# Patient Record
Sex: FEMALE | Race: WHITE | NOT HISPANIC OR LATINO | Employment: PART TIME | URBAN - METROPOLITAN AREA
[De-identification: names, ages, dates, MRNs, and addresses within clinical notes are randomized per-mention and may not be internally consistent; named-entity substitution may affect disease eponyms.]

---

## 2021-04-14 ENCOUNTER — IMMUNIZATIONS (OUTPATIENT)
Dept: FAMILY MEDICINE CLINIC | Facility: HOSPITAL | Age: 56
End: 2021-04-14

## 2021-04-14 DIAGNOSIS — Z23 ENCOUNTER FOR IMMUNIZATION: Primary | ICD-10-CM

## 2021-04-14 PROCEDURE — 0001A SARS-COV-2 / COVID-19 MRNA VACCINE (PFIZER-BIONTECH) 30 MCG: CPT

## 2021-04-14 PROCEDURE — 91300 SARS-COV-2 / COVID-19 MRNA VACCINE (PFIZER-BIONTECH) 30 MCG: CPT

## 2021-05-10 ENCOUNTER — IMMUNIZATIONS (OUTPATIENT)
Dept: FAMILY MEDICINE CLINIC | Facility: HOSPITAL | Age: 56
End: 2021-05-10

## 2021-05-10 DIAGNOSIS — Z23 ENCOUNTER FOR IMMUNIZATION: Primary | ICD-10-CM

## 2021-05-10 PROCEDURE — 91300 SARS-COV-2 / COVID-19 MRNA VACCINE (PFIZER-BIONTECH) 30 MCG: CPT

## 2021-05-10 PROCEDURE — 0002A SARS-COV-2 / COVID-19 MRNA VACCINE (PFIZER-BIONTECH) 30 MCG: CPT

## 2021-07-12 ENCOUNTER — VBI (OUTPATIENT)
Dept: ADMINISTRATIVE | Facility: OTHER | Age: 56
End: 2021-07-12

## 2021-07-12 NOTE — TELEPHONE ENCOUNTER
Upon review of the In Basket request we were able to locate, review, and update the patient chart as requested for Immunization(s) Influenza, Mammogram and Pap Smear (HPV) aka Cervical Cancer Screening  Any additional questions or concerns should be emailed to the Practice Liaisons via [email or web address] email, please do not reply via In Basket      Thank you  Dereje Cain

## 2021-08-13 RX ORDER — NEBIVOLOL 5 MG/1
5 TABLET ORAL DAILY
COMMUNITY

## 2021-08-13 RX ORDER — CHLORAL HYDRATE 500 MG
1000 CAPSULE ORAL DAILY
COMMUNITY

## 2021-08-13 RX ORDER — CHLORTHALIDONE 50 MG/1
50 TABLET ORAL DAILY
COMMUNITY

## 2021-08-13 RX ORDER — CHOLECALCIFEROL (VITAMIN D3) 1250 MCG
CAPSULE ORAL
COMMUNITY

## 2021-08-16 ENCOUNTER — ANNUAL EXAM (OUTPATIENT)
Dept: OBGYN CLINIC | Facility: CLINIC | Age: 56
End: 2021-08-16
Payer: COMMERCIAL

## 2021-08-16 VITALS
BODY MASS INDEX: 35.33 KG/M2 | SYSTOLIC BLOOD PRESSURE: 112 MMHG | HEIGHT: 62 IN | DIASTOLIC BLOOD PRESSURE: 72 MMHG | WEIGHT: 192 LBS

## 2021-08-16 DIAGNOSIS — Z12.31 ENCOUNTER FOR SCREENING MAMMOGRAM FOR MALIGNANT NEOPLASM OF BREAST: ICD-10-CM

## 2021-08-16 DIAGNOSIS — Z01.419 ROUTINE GYNECOLOGICAL EXAMINATION: Primary | ICD-10-CM

## 2021-08-16 PROCEDURE — 1036F TOBACCO NON-USER: CPT | Performed by: OBSTETRICS & GYNECOLOGY

## 2021-08-16 PROCEDURE — 99396 PREV VISIT EST AGE 40-64: CPT | Performed by: OBSTETRICS & GYNECOLOGY

## 2021-08-16 PROCEDURE — 3008F BODY MASS INDEX DOCD: CPT | Performed by: OBSTETRICS & GYNECOLOGY

## 2021-08-16 NOTE — PROGRESS NOTES
[address], [address], [place], [address] [facility]    ASSESSMENT/PLAN: Donga Check is a 61 y o  Haven Camera who presents for annual gynecologic exam     Encounter for routine gynecologic examination  - Routine well woman exam completed today  - Cervical Cancer Screening: Current ASCCP Guidelines reviewed  Last Pap: 06/15/2020   Next Pap Due: 2023  - COVID vaccine fiser  5/2021  - Colorectal cancer screening  Due in 1-2 years for polyp  - The following were reviewed in today's visit: breast self exam, mammography screening ordered, menopause, osteoporosis, adequate intake of calcium and vitamin D, exercise and healthy diet    Additional problems addressed during this visit:  1  Encounter for screening mammogram for malignant neoplasm of breast  -     Mammo screening bilateral w 3d & cad; Future; Expected date: 08/16/2022        CC:  Annual Gynecologic Examination    HPI: Flavia Mohr is a 61 y o  Haven Camera who presents for annual gynecologic examination  60 yo here for wellness exam    No bleeding, bloating and satiety  The following portions of the patient's history were reviewed and updated as appropriate: She  has a past medical history of High blood pressure, Papanicolaou smear (06/15/2020), Rheumatoid arthritis ([facility] [address] ), and Seasonal allergies  She  has a past surgical history that includes Mammo (historical); Colonoscopy; and DXA procedure(historical)  Her family history includes Coronary artery disease in her paternal aunt, paternal grandmother, and paternal uncle; Diabetes in her mother; Hyperlipidemia in her father and mother; Hypertension in her father and mother; Osteoporosis in her mother; Uterine cancer in her mother  She  reports that she has never smoked  She has never used smokeless tobacco  She reports that she does not drink alcohol and does not use drugs    Current Outpatient Medications   Medication Sig Dispense Refill    azilsartan medoxomil (EDARBI) 40 MG tablet Take 40 mg by mouth daily      chlorthalidone (HYGROTEN) 50 MG tablet Take 50 mg by mouth daily      Multiple Vitamins-Minerals (MULTI COMPLETE PO) Take by mouth      nebivolol (BYSTOLIC) 5 mg tablet Take 5 mg by mouth daily      Rosuvastatin Calcium 20 MG CPSP Take by mouth      TURMERIC CURCUMIN PO Take by mouth      Calcium Carb-Cholecalciferol (CALCIUM 1000 + D PO) Take by mouth      Cholecalciferol (Vitamin D3) 1 25 MG (09578 UT) CAPS Take by mouth      Etanercept 25 MG/0 5ML SOLN Inject under the skin      Omega-3 Fatty Acids (fish oil) 1,000 mg Take 1,000 mg by mouth daily       No current facility-administered medications for this visit  She has No Known Allergies       Review of Systems:  All systems normal except as noted in HPI          Objective:  /72 (BP Location: Left arm, Patient Position: Sitting, Cuff Size: Standard)   Ht 5' 2" (1 575 m)   Wt 87 1 kg (192 lb)   BMI 35 12 kg/m²    Physical Exam  Vitals and nursing note reviewed  Constitutional:       Appearance: Normal appearance  HENT:      Head: Normocephalic  Cardiovascular:      Rate and Rhythm: Normal rate and regular rhythm  Pulmonary:      Effort: Pulmonary effort is normal       Breath sounds: Normal breath sounds  Chest:      Chest wall: No mass, lacerations, swelling, tenderness or edema  Breasts: Paco Score is 4  Breasts are symmetrical          Right: Normal  No swelling, bleeding, inverted nipple, mass, nipple discharge, skin change or tenderness  Left: No swelling, bleeding, inverted nipple, mass, nipple discharge, skin change or tenderness  Abdominal:      General: Abdomen is flat  Bowel sounds are normal       Palpations: Abdomen is soft  Genitourinary:     General: Normal vulva  Exam position: Lithotomy position  Pubic Area: No rash  Paco stage (genital): 4       Labia:         Right: No rash, tenderness or lesion  Left: No rash, tenderness or lesion  Urethra: No urethral pain, urethral swelling or urethral lesion  Vagina: Normal       Cervix: No cervical motion tenderness or discharge  Uterus: Normal        Adnexa: Right adnexa normal and left adnexa normal       Rectum: Normal    Musculoskeletal:         General: Normal range of motion  Cervical back: Neck supple  Lymphadenopathy:      Upper Body:      Right upper body: No supraclavicular, axillary or pectoral adenopathy  Left upper body: No supraclavicular, axillary or pectoral adenopathy  Lower Body: No right inguinal adenopathy  No left inguinal adenopathy  Skin:     General: Skin is warm and dry  Neurological:      Mental Status: She is alert and oriented to person, place, and time     Psychiatric:         Mood and Affect: Mood normal

## 2022-08-29 ENCOUNTER — ANNUAL EXAM (OUTPATIENT)
Dept: OBGYN CLINIC | Facility: CLINIC | Age: 57
End: 2022-08-29
Payer: COMMERCIAL

## 2022-08-29 VITALS
SYSTOLIC BLOOD PRESSURE: 125 MMHG | DIASTOLIC BLOOD PRESSURE: 89 MMHG | HEIGHT: 62 IN | WEIGHT: 202.6 LBS | BODY MASS INDEX: 37.28 KG/M2

## 2022-08-29 DIAGNOSIS — N94.10 DYSPAREUNIA DUE TO NON-PSYCHOGENIC CAUSE IN FEMALE: ICD-10-CM

## 2022-08-29 DIAGNOSIS — R68.82 LOW LIBIDO: ICD-10-CM

## 2022-08-29 DIAGNOSIS — Z01.419 ROUTINE GYNECOLOGICAL EXAMINATION: Primary | ICD-10-CM

## 2022-08-29 DIAGNOSIS — Z78.0 POST-MENOPAUSAL: ICD-10-CM

## 2022-08-29 DIAGNOSIS — E66.9 OBESITY (BMI 30-39.9): ICD-10-CM

## 2022-08-29 DIAGNOSIS — Z12.31 ENCOUNTER FOR SCREENING MAMMOGRAM FOR MALIGNANT NEOPLASM OF BREAST: ICD-10-CM

## 2022-08-29 PROCEDURE — S0612 ANNUAL GYNECOLOGICAL EXAMINA: HCPCS | Performed by: OBSTETRICS & GYNECOLOGY

## 2022-08-29 RX ORDER — ETANERCEPT 50 MG/ML
SOLUTION SUBCUTANEOUS
COMMUNITY
Start: 2022-08-25

## 2022-08-29 RX ORDER — ESTRADIOL 0.1 MG/G
1 CREAM VAGINAL 2 TIMES WEEKLY
Qty: 42.5 G | Refills: 3 | Status: SHIPPED | OUTPATIENT
Start: 2022-08-29 | End: 2023-08-29

## 2022-08-29 NOTE — PROGRESS NOTES
[address], [address], [facility], [address] [facility]    ASSESSMENT/PLAN: Micah Schmitz is a 61 y o  Rexine Child who presents for annual gynecologic exam     Encounter for routine gynecologic examination  - Routine well woman exam completed today  - Cervical Cancer Screening: Current ASCCP Guidelines reviewed  Last Pap: 06/15/2020   Next Pap Due: 2023  - COVID vaccine fiser  5/2021  - Colorectal cancer screening  Due in 1-2 years for polyp 2023 due  To polyp   - The following were reviewed in today's visit: breast self exam, mammography screening ordered, menopause, osteoporosis, adequate intake of calcium and vitamin D, exercise and healthy diet    Additional problems addressed during this visit:  1  Routine gynecological examination    2  Encounter for screening mammogram for malignant neoplasm of breast  -     Mammo screening bilateral w 3d & cad; Future    3  Obesity (BMI 30-39  9)  Comments:  encouraged  wt loss  and exercise     4  Post-menopausal  Comments:  no bleeding, bloating or satiety  5  Low libido  Comments:  Decrease in libido, discomfort w  any relations  No bleeding  no dryness      6  Dyspareunia due to non-psychogenic cause in female  Comments:  Relationship   good , feels  uncomfortable,  Can give or take   Satisfys       present  If no improvement  Have pt see a sex counselor   Risks and benefits of   Testosterone therapy dw pt one to one  CC:  Annual Gynecologic Examination    HPI: Micah Schmitz is a 61 y o  Rexine Child who presents for annual gynecologic examination  60 yo here for wellness exam    No bleeding, bloating and satiety  + pain ;with  Relations   No drive   Gynecologic Exam        The following portions of the patient's history were reviewed and updated as appropriate: She  has a past medical history of High blood pressure, Papanicolaou smear (06/15/2020), Rheumatoid arthritis ([facility] [address] ), and Seasonal allergies    She  has a past surgical history that includes Mammo (historical); Colonoscopy; and DXA procedure(historical)  Her family history includes Coronary artery disease in her paternal aunt, paternal grandmother, and paternal uncle; Diabetes in her mother; Hyperlipidemia in her father and mother; Hypertension in her father and mother; Osteoporosis in her mother; Uterine cancer in her mother  She  reports that she has never smoked  She has never used smokeless tobacco  She reports that she does not drink alcohol and does not use drugs  Current Outpatient Medications   Medication Sig Dispense Refill    azilsartan medoxomil (EDARBI) 40 MG tablet Take 40 mg by mouth daily      chlorthalidone (HYGROTEN) 50 MG tablet Take 50 mg by mouth daily      Enbrel SureClick 50 MG/ML injection       ICOSAPENT ETHYL PO Take by mouth      Multiple Vitamins-Minerals (MULTI COMPLETE PO) Take by mouth      nebivolol (BYSTOLIC) 5 mg tablet Take 5 mg by mouth daily      Rosuvastatin Calcium 20 MG CPSP Take by mouth      Calcium Carb-Cholecalciferol (CALCIUM 1000 + D PO) Take by mouth (Patient not taking: Reported on 8/29/2022)      Cholecalciferol (Vitamin D3) 1 25 MG (57282 UT) CAPS Take by mouth (Patient not taking: Reported on 8/29/2022)      Etanercept 25 MG/0 5ML SOLN Inject under the skin (Patient not taking: Reported on 8/29/2022)      Omega-3 Fatty Acids (fish oil) 1,000 mg Take 1,000 mg by mouth daily (Patient not taking: Reported on 8/29/2022)      TURMERIC CURCUMIN PO Take by mouth (Patient not taking: Reported on 8/29/2022)       No current facility-administered medications for this visit  She has No Known Allergies       Review of Systems:  All systems normal except as noted in HPI          Objective:  /89 (BP Location: Left arm, Patient Position: Sitting, Cuff Size: Standard)   Ht 5' 2" (1 575 m)   Wt 91 9 kg (202 lb 9 6 oz)   BMI 37 06 kg/m²    Physical Exam  Vitals and nursing note reviewed     Constitutional: Appearance: Normal appearance  HENT:      Head: Normocephalic  Cardiovascular:      Rate and Rhythm: Normal rate and regular rhythm  Pulmonary:      Effort: Pulmonary effort is normal       Breath sounds: Normal breath sounds  Chest:      Chest wall: No mass, lacerations, swelling, tenderness or edema  Breasts: Paco Score is 4  Breasts are symmetrical       Right: Normal  No swelling, bleeding, inverted nipple, mass, nipple discharge, skin change, tenderness, axillary adenopathy or supraclavicular adenopathy  Left: No swelling, bleeding, inverted nipple, mass, nipple discharge, skin change, tenderness, axillary adenopathy or supraclavicular adenopathy  Abdominal:      General: Abdomen is flat  Bowel sounds are normal       Palpations: Abdomen is soft  Genitourinary:     General: Normal vulva  Exam position: Lithotomy position  Pubic Area: No rash  Paco stage (genital): 4       Labia:         Right: No rash, tenderness or lesion  Left: No rash, tenderness or lesion  Urethra: No urethral pain, urethral swelling or urethral lesion  Vagina: Normal       Cervix: Normal       Uterus: Normal        Adnexa: Right adnexa normal and left adnexa normal       Rectum: Normal       Comments: Vagina pale pink   Musculoskeletal:         General: Normal range of motion  Cervical back: Neck supple  Lymphadenopathy:      Upper Body:      Right upper body: No supraclavicular, axillary or pectoral adenopathy  Left upper body: No supraclavicular, axillary or pectoral adenopathy  Lower Body: No right inguinal adenopathy  No left inguinal adenopathy  Skin:     General: Skin is warm and dry  Neurological:      Mental Status: She is alert and oriented to person, place, and time     Psychiatric:         Mood and Affect: Mood normal          Behavior: Behavior normal

## 2023-02-15 ENCOUNTER — TELEPHONE (OUTPATIENT)
Dept: GASTROENTEROLOGY | Facility: CLINIC | Age: 58
End: 2023-02-15

## 2023-02-15 ENCOUNTER — PREP FOR PROCEDURE (OUTPATIENT)
Dept: GASTROENTEROLOGY | Facility: CLINIC | Age: 58
End: 2023-02-15

## 2023-02-15 DIAGNOSIS — Z86.010 HISTORY OF COLON POLYPS: Primary | ICD-10-CM

## 2023-02-15 NOTE — TELEPHONE ENCOUNTER
02/15/23  Screened by: Anya Catherine    Referring Provider N/A    Pre- Screening: There is no height or weight on file to calculate BMI  Has patient been referred for a routine screening Colonoscopy? yes  Is the patient between 39-70 years old? yes      Previous Colonoscopy yes   If yes:    Date: 5YRS    Facility:      Reason: hx of polyps      SCHEDULING STAFF: If the patient is between 45yrs-49yrs, please advise patient to confirm benefits/coverage with their insurance company for a routine screening colonoscopy, some insurance carriers will only cover at [facility] or [facility]  If the patient is over 66years old, please schedule an office visit  Does the patient want to see a Gastroenterologist prior to their procedure OR are they having any GI symptoms? no    Has the patient been hospitalized or had abdominal surgery in the past 6 months? no    Does the patient use supplemental oxygen? no    Does the patient take Coumadin, Lovenox, Plavix, Elliquis, Xarelto, or other blood thinning medication? no    Has the patient had a stroke, cardiac event, or stent placed in the past year? no    SCHEDULING STAFF: If patient answers NO to above questions, then schedule procedure  If patient answers YES to above questions, then schedule office appointment  Pt Passed OA    If patient is between 45yrs - 49yrs, please advise patient that we will have to confirm benefits & coverage with their insurance company for a routine screening colonoscopy

## 2023-02-15 NOTE — TELEPHONE ENCOUNTER
Scheduled date of colonoscopy (as of today): 3/15/2023  Physician performing colonoscopy: Dr Haylee Jacobs  Location of colonoscopy: [facility]  Clearances: N/A

## 2023-02-15 NOTE — TELEPHONE ENCOUNTER
[facility] [address] Assessment    Name: Chong Gowers  YOB: 1965  Last Height: 5' 2" (1 575 m)  Last weight: 91 9 kg (202 lb 9 6 oz)  BMI: 37 06 kg/m²  Procedure: Colon  Diagnosis: Hx of polyps  Date of procedure: 3/15/2023  Prep:   Responsible : Bony  Phone#: [phone]  Name completing form: Tita Anderson  Date form completed: 02/15/23      If the patient answers yes to any of these questions, schedule in a hospital  Are you pregnant: No  Do you rely on a wheelchair for mobility: No  Have you been diagnosed with End Stage Renal Disease (ESRD): No  Do you need oxygen during the day: No  Have you had a heart attack or stroke within the past three months: No  Have you had a seizure within the past three months: No  Have you ever been informed by anesthesia that you have a difficult airway: No  Additional Questions  Have you had any cardiac testing or are under the care of a Cardiologist (see cardiac list): No  Cardiac list:   Do you have an implanted cardiac defibrillator: No (Comment:  This patient should be scheduled in the hospital)    Have any bleeding problems, such as anemia or hemophilia (If patient has H&H result below 8, schedule in hospital   H&H must be within 30 days of procedure): No    Had an organ transplant within the past 3 months: No    Do you have any present infections: No  Do you get short of breath when walking a few blocks: No  Have you been diagnosed with diabetes: No  Comments (provide cardiac provider information if applicable):

## 2023-03-29 DIAGNOSIS — Z12.11 SCREENING FOR COLON CANCER: Primary | ICD-10-CM

## 2023-04-13 DIAGNOSIS — Z12.31 ENCOUNTER FOR SCREENING MAMMOGRAM FOR MALIGNANT NEOPLASM OF BREAST: ICD-10-CM

## 2024-01-15 ENCOUNTER — ANNUAL EXAM (OUTPATIENT)
Dept: OBGYN CLINIC | Facility: CLINIC | Age: 59
End: 2024-01-15
Payer: COMMERCIAL

## 2024-01-15 VITALS — HEIGHT: 62 IN | WEIGHT: 193 LBS | BODY MASS INDEX: 35.51 KG/M2

## 2024-01-15 DIAGNOSIS — R68.82 LOW LIBIDO: ICD-10-CM

## 2024-01-15 DIAGNOSIS — E66.9 OBESITY (BMI 30-39.9): ICD-10-CM

## 2024-01-15 DIAGNOSIS — Z78.0 POST-MENOPAUSAL: ICD-10-CM

## 2024-01-15 DIAGNOSIS — Z01.419 ENCOUNTER FOR GYNECOLOGICAL EXAMINATION WITHOUT ABNORMAL FINDING: Primary | ICD-10-CM

## 2024-01-15 DIAGNOSIS — Z12.4 SCREENING FOR CERVICAL CANCER: ICD-10-CM

## 2024-01-15 DIAGNOSIS — Z12.31 ENCOUNTER FOR SCREENING MAMMOGRAM FOR MALIGNANT NEOPLASM OF BREAST: ICD-10-CM

## 2024-01-15 DIAGNOSIS — N95.2 ATROPHIC VAGINITIS: ICD-10-CM

## 2024-01-15 PROCEDURE — S0612 ANNUAL GYNECOLOGICAL EXAMINA: HCPCS | Performed by: OBSTETRICS & GYNECOLOGY

## 2024-01-15 RX ORDER — ESTRADIOL 2 MG/1
7.5 RING VAGINAL
Qty: 1 EACH | Refills: 3 | Status: SHIPPED | OUTPATIENT
Start: 2024-01-15 | End: 2025-01-14

## 2024-01-15 RX ORDER — ESTRADIOL 0.1 MG/G
1 CREAM VAGINAL 2 TIMES WEEKLY
Qty: 42.5 G | Refills: 1 | Status: CANCELLED | OUTPATIENT
Start: 2024-01-15 | End: 2025-01-14

## 2024-01-15 RX ORDER — FLUTICASONE PROPIONATE 50 MCG
SPRAY, SUSPENSION (ML) NASAL
COMMUNITY
Start: 2024-01-04

## 2024-01-15 RX ORDER — SODIUM CAPRYLATE
POWDER (GRAM) MISCELLANEOUS
COMMUNITY

## 2024-01-15 RX ORDER — TOBRAMYCIN AND DEXAMETHASONE 3; 1 MG/ML; MG/ML
SUSPENSION/ DROPS OPHTHALMIC
COMMUNITY
Start: 2023-11-13

## 2024-01-15 RX ORDER — AMPICILLIN TRIHYDRATE 250 MG
500 CAPSULE ORAL DAILY
COMMUNITY

## 2024-01-15 RX ORDER — LANOLIN ALCOHOL/MO/W.PET/CERES
1 CREAM (GRAM) TOPICAL 3 TIMES DAILY
COMMUNITY

## 2024-01-15 RX ORDER — AZILSARTAN KAMEDOXOMIL 80 MG/1
80 TABLET ORAL DAILY
COMMUNITY
Start: 2023-12-08

## 2024-01-15 NOTE — PROGRESS NOTES
"[facility] - [address], [address], [place] [address]    ASSESSMENT/PLAN: Maria Luz Boothe is a 58 y.o.  who presents for annual gynecologic exam.    Encounter for routine gynecologic examination  - Routine well woman exam completed today.  - Cervical Cancer Screening: Current ASCCP Guidelines reviewed. Last Pap: 06/15/2020 . Next Pap Due: today  -colonoscopy    - Contraceptive counseling discussed.  Current contraception: none or condoms:   - Breast Cancer Screening: Last Mammogram 2023,     Additional problems addressed during this visit:  1. Encounter for gynecological examination without abnormal finding    2. Obesity (BMI 30-39.9)    3. Screening for cervical cancer  -     IGP, Aptima HPV, Rfx 16/18,45    4. Low libido  -     Estradiol (Estring) 7.5 MCG/24HR RING; Insert 7.5 mg into the vagina every 3 (three) months  -     Ambulatory Referral to Gynecology; Future    5. Post-menopausal    6. Encounter for screening mammogram for malignant neoplasm of breast  -     Mammo screening bilateral w 3d & cad; Future    7. Low libido  Comments:  Decrease in libido, discomfort w  any relations.  No bleeding  no dryness    Orders:  -     Estradiol (Estring) 7.5 MCG/24HR RING; Insert 7.5 mg into the vagina every 3 (three) months  -     Ambulatory Referral to Gynecology; Future    8. Atrophic vaginitis  -     Estradiol (Estring) 7.5 MCG/24HR RING; Insert 7.5 mg into the vagina every 3 (three) months  -     Ambulatory Referral to Gynecology; Future    59 yo  here with .  + long  hx of decrease in libido and painful intercourse.  No bleeding, bloating or satiety.   + pain on insertion and  w   deep penetration.   1-2 ties a week just \"does it\".  No desire.  + dw pt and   desire is multifactorial.  + pain   not really helped w estrogen  cream.   Aware that testosterone is really only thing that helps.  No  hot flashes. Bladder wnl   bowels wnl    Will switch to  Estring  for " 3 mo to see if helps  w  dryness  and some irritation.  Dw pt and  that  we can do a consult w  menopause specialist .      CC:  Annual Gynecologic Examination    HPI: Maria Luz Boothe is a 58 y.o.  who presents for annual gynecologic examination.  57 yo  post menopausal here for wellness exam.  Decrease  in libido  .  + pain   with relations   No bleeding, bloating or satiety.  No real difference w  vaginal estrogen cream.      The following portions of the patient's history were reviewed and updated as appropriate: She  has a past medical history of Arthritis, High blood pressure, Hypertension, Papanicolaou smear (06/15/2020), Rheumatoid arthritis (HCC), and Seasonal allergies.  She  has a past surgical history that includes Mammo (historical) (2023); Colonoscopy; DXA procedure(historical); and Breast biopsy.  Her family history includes Coronary artery disease in her paternal aunt, paternal grandmother, and paternal uncle; Diabetes in her mother; Hyperlipidemia in her father and mother; Hypertension in her father and mother; Osteoporosis in her mother; Uterine cancer in her mother.  She  reports that she has never smoked. She has never been exposed to tobacco smoke. She has never used smokeless tobacco. She reports that she does not drink alcohol and does not use drugs.  Current Outpatient Medications   Medication Sig Dispense Refill   • azilsartan medoxomil (EDARBI) 40 MG tablet Take 40 mg by mouth daily     • chlorthalidone (HYGROTEN) 50 MG tablet Take 50 mg by mouth daily     • Cinnamon 500 MG capsule Take 500 mg by mouth daily     • Edarbi 80 MG tablet Take 80 mg by mouth daily     • Enbrel SureClick 50 MG/ML injection      • estradiol (ESTRACE VAGINAL) 0.1 mg/g vaginal cream Insert 1 g into the vagina 2 (two) times a week 42.5 g 3   • Estradiol (Estring) 7.5 MCG/24HR RING Insert 7.5 mg into the vagina every 3 (three) months 1 each 3   • fluticasone (FLONASE) 50 mcg/act nasal spray USE 1  "SPRAY(S) IN EACH NOSTRIL ONCE DAILY     • glucosamine-chondroitin 500-400 MG tablet Take 1 tablet by mouth Three times a day     • ICOSAPENT ETHYL PO Take by mouth     • Multiple Vitamins-Minerals (MULTI COMPLETE PO) Take by mouth     • nebivolol (BYSTOLIC) 5 mg tablet Take 5 mg by mouth daily     • Rosuvastatin Calcium 20 MG CPSP Take by mouth     • Sodium Caprylate POWD Take by mouth     • tobramycin-dexamethasone (TOBRADEX) ophthalmic suspension INSTILL 1 DROP INTO AFFECTED EYE 4 TIMES DAILY FOR 10 DAYS     • TURMERIC CURCUMIN PO Take by mouth     • Calcium Carb-Cholecalciferol (CALCIUM 1000 + D PO) Take by mouth     • Etanercept 25 MG/0.5ML SOLN Inject under the skin (Patient not taking: Reported on 8/29/2022)       No current facility-administered medications for this visit.     She has No Known Allergies..    Review of Systems   Constitutional:  Negative for activity change, appetite change, chills, fever and unexpected weight change.   HENT:  Positive for congestion. Negative for ear pain and sore throat.    Eyes:  Negative for pain and visual disturbance.   Respiratory:  Negative for cough and shortness of breath.    Cardiovascular:  Negative for chest pain and palpitations.   Gastrointestinal:  Negative for abdominal pain and vomiting.   Endocrine: Negative.  Negative for cold intolerance.   Genitourinary:  Positive for dyspareunia. Negative for difficulty urinating, dysuria, hematuria, pelvic pain and vaginal pain.   Musculoskeletal:  Negative for arthralgias and back pain.   Skin:  Negative for color change and rash.   Neurological:  Negative for dizziness, seizures, syncope, facial asymmetry and headaches.   Hematological: Negative.    Psychiatric/Behavioral: Negative.     All other systems reviewed and are negative.        Objective:  Ht 5' 2\" (1.575 m)   Wt 87.5 kg (193 lb)   BMI 35.30 kg/m²    Physical Exam  Vitals and nursing note reviewed.   Constitutional:       Appearance: Normal appearance. "   HENT:      Head: Normocephalic.   Cardiovascular:      Rate and Rhythm: Normal rate and regular rhythm.      Pulses: Normal pulses.      Heart sounds: Normal heart sounds.   Pulmonary:      Effort: Pulmonary effort is normal.      Breath sounds: Normal breath sounds.   Chest:      Chest wall: No mass, lacerations, swelling, tenderness or edema.   Breasts:     Paco Score is 4.      Breasts are symmetrical.      Right: Normal. No swelling, bleeding, inverted nipple, mass, nipple discharge, skin change or tenderness.      Left: No swelling, bleeding, inverted nipple, mass, nipple discharge, skin change or tenderness.   Abdominal:      General: Abdomen is flat. Bowel sounds are normal.      Palpations: Abdomen is soft.   Genitourinary:     General: Normal vulva.      Exam position: Lithotomy position.      Pubic Area: No rash.       Paco stage (genital): 4.      Labia:         Right: No rash, tenderness or lesion.         Left: No rash, tenderness or lesion.       Urethra: No urethral pain, urethral swelling or urethral lesion.      Vagina: Normal.      Cervix: No cervical motion tenderness or discharge.      Uterus: Normal.       Adnexa: Right adnexa normal and left adnexa normal.      Rectum: Normal.       Musculoskeletal:         General: Normal range of motion.      Cervical back: Neck supple.   Lymphadenopathy:      Upper Body:      Right upper body: No supraclavicular, axillary or pectoral adenopathy.      Left upper body: No supraclavicular, axillary or pectoral adenopathy.      Lower Body: No right inguinal adenopathy. No left inguinal adenopathy.   Skin:     General: Skin is warm and dry.   Neurological:      General: No focal deficit present.      Mental Status: She is alert and oriented to person, place, and time.   Psychiatric:         Mood and Affect: Mood normal.         Behavior: Behavior normal.         Thought Content: Thought content normal.         Judgment: Judgment normal.

## 2024-01-17 LAB
CYTOLOGIST CVX/VAG CYTO: NORMAL
DX ICD CODE: NORMAL
HPV GENOTYPE REFLEX: NORMAL
HPV I/H RISK 4 DNA CVX QL PROBE+SIG AMP: NEGATIVE
OTHER STN SPEC: NORMAL
PATH REPORT.FINAL DX SPEC: NORMAL
SL AMB NOTE:: NORMAL
SL AMB SPECIMEN ADEQUACY: NORMAL
SL AMB TEST METHODOLOGY: NORMAL

## 2024-02-21 PROBLEM — Z12.4 SCREENING FOR CERVICAL CANCER: Status: RESOLVED | Noted: 2024-01-15 | Resolved: 2024-02-21

## 2024-04-19 DIAGNOSIS — Z12.31 ENCOUNTER FOR SCREENING MAMMOGRAM FOR MALIGNANT NEOPLASM OF BREAST: ICD-10-CM

## 2025-01-30 ENCOUNTER — ANNUAL EXAM (OUTPATIENT)
Dept: OBGYN CLINIC | Facility: CLINIC | Age: 60
End: 2025-01-30
Payer: COMMERCIAL

## 2025-01-30 VITALS — BODY MASS INDEX: 35.7 KG/M2 | WEIGHT: 194 LBS | HEIGHT: 62 IN

## 2025-01-30 DIAGNOSIS — Z78.0 POST-MENOPAUSAL: ICD-10-CM

## 2025-01-30 DIAGNOSIS — Z01.419 ENCOUNTER FOR GYNECOLOGICAL EXAMINATION WITHOUT ABNORMAL FINDING: Primary | ICD-10-CM

## 2025-01-30 DIAGNOSIS — Z12.31 ENCOUNTER FOR SCREENING MAMMOGRAM FOR MALIGNANT NEOPLASM OF BREAST: ICD-10-CM

## 2025-01-30 DIAGNOSIS — R68.82 LOW LIBIDO: ICD-10-CM

## 2025-01-30 DIAGNOSIS — N95.2 ATROPHIC VAGINITIS: ICD-10-CM

## 2025-01-30 DIAGNOSIS — E66.9 OBESITY (BMI 30-39.9): ICD-10-CM

## 2025-01-30 PROCEDURE — 99396 PREV VISIT EST AGE 40-64: CPT | Performed by: OBSTETRICS & GYNECOLOGY

## 2025-01-30 RX ORDER — ALBUTEROL SULFATE 90 UG/1
INHALANT RESPIRATORY (INHALATION)
COMMUNITY
Start: 2025-01-24

## 2025-01-30 RX ORDER — ALLOPURINOL 100 MG/1
TABLET ORAL
COMMUNITY
Start: 2025-01-21

## 2025-01-30 RX ORDER — COLCHICINE 0.6 MG/1
TABLET ORAL
COMMUNITY
Start: 2025-01-21

## 2025-01-30 RX ORDER — ALLOPURINOL 300 MG/1
1 TABLET ORAL DAILY
COMMUNITY
Start: 2025-01-20

## 2025-01-30 NOTE — PROGRESS NOTES
[facility] - [address], [address], [place] [address]    ASSESSMENT/PLAN: Maria Luz Boothe is a 59 y.o.  who presents for annual gynecologic exam.    Encounter for routine gynecologic examination  - Routine well woman exam completed today.  - Cervical Cancer Screening: Current ASCCP Guidelines reviewed. Last Pap: 01/15/2024. Next Pap Due: 202  - Breast Cancer Screening: Last Mammogram 04/15/2024,  - Colorectal cancer screening was not ordered. 2024 done   - The following were reviewed in today's visit: breast self exam, mammography screening ordered, menopause, adequate intake of calcium and vitamin D, exercise, healthy diet, and colonoscopy discussed     Additional problems addressed during this visit:  1. Encounter for gynecological examination without abnormal finding  2. Encounter for screening mammogram for malignant neoplasm of breast  -     Mammo screening bilateral w 3d and cad; Future  3. Low libido  Comments:  Did not see Dr Gilberto Hogan,  Darci pt  libido  and  hormones.  New referral placed  Orders:  -     Ambulatory Referral to Obstetrics / Gynecology; Future  4. Post-menopausal  5. Atrophic vaginitis  Comments:  Option of vagifem  dw pt and .  Foster not help libido  6. Obesity (BMI 30-39.9)  Comments:  helathy diet and exercise      CC:  Annual Gynecologic Examination    HPI: Maria Luz Boothe is a 59 y.o.  who presents for annual gynecologic examination.  60 yo here for wellness  exam.    No bleeding, bloting  or satiety.  + Estring felt   irritated and had   pain .  Partner felt .  No real increase in libido .  No  dryness    + hx of htn.   Supportive  partner.   + recent  dx of gout.  +  had  a fainting spell  last wek  hydrated     The following portions of the patient's history were reviewed and updated as appropriate: She  has a past medical history of Arthritis, High blood pressure, Hypertension, Papanicolaou smear (2024), Rheumatoid arthritis  ([facility]), and Seasonal allergies.  She  has a past surgical history that includes Mammo (historical) (04/15/2024); Colonoscopy; DXA procedure(historical); and Breast biopsy.  Her family history includes Coronary artery disease in her paternal aunt, paternal grandmother, and paternal uncle; Diabetes in her mother; Hyperlipidemia in her father and mother; Hypertension in her father and mother; Osteoporosis in her mother; Uterine cancer in her mother.  She  reports that she has never smoked. She has never been exposed to tobacco smoke. She has never used smokeless tobacco. She reports that she does not drink alcohol and does not use drugs.  Current Outpatient Medications   Medication Sig Dispense Refill   • albuterol (PROVENTIL HFA,VENTOLIN HFA) 90 mcg/act inhaler inhale 1 puff by mouth every 4 hours as needed     • allopurinol (ZYLOPRIM) 100 mg tablet      • allopurinol (ZYLOPRIM) 300 mg tablet Take 1 tablet by mouth in the morning     • chlorthalidone (HYGROTEN) 50 MG tablet Take 50 mg by mouth daily     • Cinnamon 500 MG capsule Take 500 mg by mouth daily     • colchicine (COLCRYS) 0.6 mg tablet      • Enbrel SureClick 50 MG/ML injection      • fluticasone (FLONASE) 50 mcg/act nasal spray USE 1 SPRAY(S) IN EACH NOSTRIL ONCE DAILY     • glucosamine-chondroitin 500-400 MG tablet Take 1 tablet by mouth Three times a day     • ICOSAPENT ETHYL PO Take by mouth     • Multiple Vitamins-Minerals (MULTI COMPLETE PO) Take by mouth     • nebivolol (BYSTOLIC) 5 mg tablet Take 5 mg by mouth daily 7.5 mg daily     • Rosuvastatin Calcium 20 MG CPSP Take by mouth     • tobramycin-dexamethasone (TOBRADEX) ophthalmic suspension INSTILL 1 DROP INTO AFFECTED EYE 4 TIMES DAILY FOR 10 DAYS     • TURMERIC CURCUMIN PO Take by mouth     • Calcium Carb-Cholecalciferol (CALCIUM 1000 + D PO) Take by mouth     • Edarbi 80 MG tablet Take 80 mg by mouth daily (Patient not taking: Reported on 1/30/2025)     • Etanercept 25 MG/0.5ML SOLN Inject under  "the skin (Patient not taking: Reported on 8/29/2022)     • Sodium Caprylate POWD Take by mouth (Patient not taking: Reported on 1/30/2025)       No current facility-administered medications for this visit.     She has no known allergies..    Review of Systems:  All systems normal except as noted in HPI          Objective:  Ht 5' 2\" (1.575 m)   Wt 88 kg (194 lb)   BMI 35.48 kg/m²    Physical Exam  Vitals and nursing note reviewed.   Constitutional:       Appearance: Normal appearance.   HENT:      Head: Normocephalic.   Cardiovascular:      Rate and Rhythm: Normal rate and regular rhythm.      Pulses: Normal pulses.      Heart sounds: Normal heart sounds.   Pulmonary:      Effort: Pulmonary effort is normal.      Breath sounds: Normal breath sounds.   Chest:      Chest wall: No mass, lacerations, swelling, tenderness or edema.   Breasts:     Paco Score is 4.      Breasts are symmetrical.      Right: Normal. No swelling, bleeding, inverted nipple, mass, nipple discharge, skin change or tenderness.      Left: No swelling, bleeding, inverted nipple, mass, nipple discharge, skin change or tenderness.   Abdominal:      General: Abdomen is flat. Bowel sounds are normal.      Palpations: Abdomen is soft.   Genitourinary:     General: Normal vulva.      Exam position: Lithotomy position.      Pubic Area: No rash.       Paco stage (genital): 4.      Labia:         Right: No rash, tenderness or lesion.         Left: No rash, tenderness or lesion.       Urethra: No urethral pain, urethral swelling or urethral lesion.      Vagina: Normal.      Cervix: No cervical motion tenderness or discharge.      Uterus: Normal.       Adnexa: Right adnexa normal and left adnexa normal.      Rectum: Normal.      Comments: Mild atrophy noted   Musculoskeletal:         General: Normal range of motion.      Cervical back: Normal range of motion and neck supple.   Lymphadenopathy:      Upper Body:      Right upper body: No supraclavicular, " axillary or pectoral adenopathy.      Left upper body: No supraclavicular, axillary or pectoral adenopathy.      Lower Body: No right inguinal adenopathy. No left inguinal adenopathy.   Skin:     General: Skin is warm and dry.   Neurological:      General: No focal deficit present.      Mental Status: She is alert and oriented to person, place, and time.   Psychiatric:         Mood and Affect: Mood normal.         Behavior: Behavior normal.         Thought Content: Thought content normal.         Judgment: Judgment normal.

## 2025-03-01 PROBLEM — Z01.419 ENCOUNTER FOR GYNECOLOGICAL EXAMINATION WITHOUT ABNORMAL FINDING: Status: RESOLVED | Noted: 2025-01-30 | Resolved: 2025-03-01

## 2025-04-23 DIAGNOSIS — Z12.31 ENCOUNTER FOR SCREENING MAMMOGRAM FOR MALIGNANT NEOPLASM OF BREAST: ICD-10-CM
